# Patient Record
(demographics unavailable — no encounter records)

---

## 2024-10-31 NOTE — PHYSICAL EXAM
[Alert] : alert [Well Nourished] : well nourished [Healthy Appearance] : healthy appearance [No Acute Distress] : no acute distress [Well Developed] : well developed [Normal Voice/Communication] : normal voice communication [Normal Sclera/Conjunctiva] : normal sclera/conjunctiva [EOMI] : extra ocular movement intact [Normal Hearing] : hearing was normal [No Respiratory Distress] : no respiratory distress [Normal Rate and Effort] : normal respiratory rate and effort [Normal Rate] : heart rate was normal [Regular Rhythm] : with a regular rhythm [Spine Straight] : spine straight [No Stigmata of Cushings Syndrome] : no stigmata of Cushings Syndrome [Normal Gait] : normal gait [Normal Strength/Tone] : muscle strength and tone were normal [Acanthosis Nigricans] : no acanthosis nigricans [Foot Ulcers] : no foot ulcers [Hirsutism] : no hirsutism [Delayed in the Right Toes] : delayed in the toes [Delayed in the Left Toes] : delayed in the toes [0] : 0 in the dorsalis pedis [Diminished Throughout Both Feet] : normal tactile sensation with monofilament testing throughout both feet [No Motor Deficits] : the motor exam was normal [Normal Sensation on Monofilament Testing] : normal sensation on monofilament testing of lower extremities [Oriented x3] : oriented to person, place, and time [Normal Affect] : the affect was normal [Recent Memory Normal] : recent memory was not impaired [Normal Insight/Judgement] : insight and judgment were intact [Normal Mood] : the mood was normal [Remote Memory Normal] : remote memory was not impaired

## 2024-10-31 NOTE — REVIEW OF SYSTEMS
[Stress] : stress [All other systems negative] : All other systems negative [FreeTextEntry5] : intermittent b/l calf pain with walking

## 2024-10-31 NOTE — HISTORY OF PRESENT ILLNESS
[FreeTextEntry1] : DM2 diagnosed in 1999, uncomplicated. Taking nothing Compliance: Yes Tolerating: Yes Therapies tried: Metformin (GI intolerance), Glimepiride, Januvia Last HgA1c in June 2024 was 9.7%, previously around 6 one year prior. Checks BG infrequently Hypoglycemia: Denies Polyuria, polydipsia, unintentional weight loss: Denies   Diabetic emergencies: Denies   Microvascular complications Last albumin/creatinine ratio: unknown Neuropathy symptoms: Denies Microfilament exam: normal; July 2024 Retinopathy: Denies  Last eye exam: Annually   Macrovascular complications CAD/CVA/PVD: Denies   HTN: Yes, on valsartan 160 mg as part of multi-drug regimen. LDL: Unknown; taking rosuvastatin 10 mg daily.  Goal is < 70 mg/dL.   Diet: Carb-mindful Exercise: Minimal walking Immunizations: Pneumonia vaccine discussed; patient defers  Family DM History: Both sides.

## 2024-10-31 NOTE — REASON FOR VISIT
[Follow - Up] : a follow-up visit [DM Type 2] : DM Type 2 [Source: ______] : History obtained from ARMINDA